# Patient Record
Sex: MALE | Race: WHITE
[De-identification: names, ages, dates, MRNs, and addresses within clinical notes are randomized per-mention and may not be internally consistent; named-entity substitution may affect disease eponyms.]

---

## 2021-10-15 ENCOUNTER — HOSPITAL ENCOUNTER (OUTPATIENT)
Dept: HOSPITAL 47 - ORWHC2ENDO | Age: 61
Discharge: HOME | End: 2021-10-15
Attending: INTERNAL MEDICINE
Payer: COMMERCIAL

## 2021-10-15 VITALS
RESPIRATION RATE: 16 BRPM | TEMPERATURE: 97.3 F | HEART RATE: 54 BPM | DIASTOLIC BLOOD PRESSURE: 89 MMHG | SYSTOLIC BLOOD PRESSURE: 149 MMHG

## 2021-10-15 VITALS — BODY MASS INDEX: 33 KG/M2

## 2021-10-15 DIAGNOSIS — G47.33: ICD-10-CM

## 2021-10-15 DIAGNOSIS — E11.9: ICD-10-CM

## 2021-10-15 DIAGNOSIS — D12.8: ICD-10-CM

## 2021-10-15 DIAGNOSIS — Z12.11: Primary | ICD-10-CM

## 2021-10-15 DIAGNOSIS — E66.9: ICD-10-CM

## 2021-10-15 LAB — GLUCOSE BLD-MCNC: 164 MG/DL (ref 75–99)

## 2021-10-15 PROCEDURE — 45334 SIGMOIDOSCOPY FOR BLEEDING: CPT

## 2021-10-15 PROCEDURE — 45338 SIGMOIDOSCOPY W/TUMR REMOVE: CPT

## 2021-10-15 PROCEDURE — 45331 SIGMOIDOSCOPY AND BIOPSY: CPT

## 2021-10-15 PROCEDURE — 88305 TISSUE EXAM BY PATHOLOGIST: CPT

## 2021-10-15 RX ADMIN — POTASSIUM CHLORIDE SCH MLS: 14.9 INJECTION, SOLUTION INTRAVENOUS at 06:50

## 2021-10-15 NOTE — P.PCN
Date of Procedure: 10/15/21


Procedure(s) Performed: 


BRIEF HISTORY: Patient is a 60-year-old pleasant white male scheduled for an 

elective colonoscopy as a part of follow-up of large rectal polyp noted on a 

colonoscopy a month ago.  Biopsies revealed adenoma.


 


PROCEDURE PERFORMED: Sigmoidoscopy with snare polypectomy and argon plasma 

coagulation





PREOPERATIVE DIAGNOSIS: Follow-up large rectal polyps noted on recent. 





IV sedation per Anesthesia. 





PROCEDURE: After informed consent was obtained, the patient, was brought into 

the endoscopy unit. IV sedation was administered by Anesthesia under continuous 

monitoring.  Digital rectal examination was normal. Initially the Olympus CF-160

flexible video colonoscope was then inserted in the rectum, gradually advanced 

into the sigmoid colon.  Prep was fair.  Mucosa of the sigmoid colon appeared 

normal.  In the mid rectum there was a 1.5 cm sessile flat polyp that was 

partially removed by snare polypectomy followed by hot biopsy and argon plasma 

coagulation.  In the distal rectum, just proximal to the dentate line there were

2 polyps adjacent to each other.  They measured 5 mm and 1 cm in size both of 

which were removed by snare polypectomy and complete polypectomy accomplished.. 

Retroflexion was performed in the rectum and no lesions were seen. The patient 

tolerated the procedure well. 





IMPRESSION: 


1.5 cmi flat polyp in the mid rectum status post piecemeal snare polypectomy 

followed by hot biopsy and argon plasma coagulation 


1 cm residual distal rectal polyp just proximal to the dentate line status post 

snare polypectomy


5 mm distal rectal polyp status post polypectomy 





RECOMMENDATIONS:  Findings of this examination were discussed with the patientas

well as his family.  He was advised to follow with the biopsy results.  Reason 

the biopsy results will plan a repeat flexible sigmoidoscope in 6 months].

## 2022-04-29 ENCOUNTER — HOSPITAL ENCOUNTER (OUTPATIENT)
Dept: HOSPITAL 47 - ORWHC2ENDO | Age: 62
Discharge: HOME | End: 2022-04-29
Attending: INTERNAL MEDICINE
Payer: COMMERCIAL

## 2022-04-29 VITALS — RESPIRATION RATE: 16 BRPM

## 2022-04-29 VITALS — TEMPERATURE: 97.7 F

## 2022-04-29 VITALS — SYSTOLIC BLOOD PRESSURE: 108 MMHG | HEART RATE: 68 BPM | DIASTOLIC BLOOD PRESSURE: 44 MMHG

## 2022-04-29 VITALS — BODY MASS INDEX: 30.9 KG/M2

## 2022-04-29 DIAGNOSIS — E11.9: ICD-10-CM

## 2022-04-29 DIAGNOSIS — G47.33: ICD-10-CM

## 2022-04-29 DIAGNOSIS — Z98.890: ICD-10-CM

## 2022-04-29 DIAGNOSIS — Z79.899: ICD-10-CM

## 2022-04-29 DIAGNOSIS — Z86.010: ICD-10-CM

## 2022-04-29 DIAGNOSIS — E78.5: ICD-10-CM

## 2022-04-29 DIAGNOSIS — Z79.84: ICD-10-CM

## 2022-04-29 DIAGNOSIS — I10: ICD-10-CM

## 2022-04-29 DIAGNOSIS — Z12.11: Primary | ICD-10-CM

## 2022-04-29 DIAGNOSIS — Z90.89: ICD-10-CM

## 2022-04-29 DIAGNOSIS — D12.8: ICD-10-CM

## 2022-04-29 LAB — GLUCOSE BLD-MCNC: 101 MG/DL (ref 75–99)

## 2022-04-29 PROCEDURE — 88305 TISSUE EXAM BY PATHOLOGIST: CPT

## 2022-04-29 PROCEDURE — 45338 SIGMOIDOSCOPY W/TUMR REMOVE: CPT

## 2022-04-29 NOTE — P.PCN
Date of Procedure: 04/29/22


Procedure(s) Performed: 


BRIEF HISTORY: Patient is a 61-year-old pleasant white male scheduled for an 

elective colonoscopy as a part of follow-up of large rectal polyps with high-

grade dysplasia noted on a colonoscopy in October 2021.





PROCEDURE PERFORMED: Flexible sigmoidoscopy with snare polypectomy. 





PREOPERATIVE DIAGNOSIS: Follow-up rectal polyps with high-grade dysplasia. 





IV sedation per Anesthesia. 





PROCEDURE: After informed consent was obtained, the patient, was brought into 

the endoscopy unit. IV sedation was administered by Anesthesia under continuous 

monitoring.  Digital rectal examination was normal. Initially the Olympus CF-160

flexible video colonoscope was then inserted in the rectum, gradually advanced 

into the splenic flexure.  Careful examination was performed.  Prep was 

excellent.  Mucosa of the colon descending colon and sigmoid; appeared normal.  

In the distal rectum just proximal to the dentate line there was a 1 cm and 5 mm

sessile polyps removed by snare polypectomy.  There was no residual polyp noted 

in the mid rectum.  Retroflexed was performed in the rectum and no lesions were 

noted.  Patient tolerated the procedure well.





Impression: 


1 cm flat and 5 mm sessile descending colon and sigmoid colon appeared normal. 


No residual polyp noted in the mid rectal polyp





Recommendations:


 Findings of this examination were discussed with the patient .as well as his 

family.  He was advised to follow with the biopsy results.  He was advised to 

have a repeat colonoscopy in 3 years.

## 2023-03-20 ENCOUNTER — HOSPITAL ENCOUNTER (EMERGENCY)
Dept: HOSPITAL 47 - EC | Age: 63
Discharge: HOME | End: 2023-03-20
Payer: COMMERCIAL

## 2023-03-20 VITALS — TEMPERATURE: 98.2 F

## 2023-03-20 VITALS — DIASTOLIC BLOOD PRESSURE: 99 MMHG | SYSTOLIC BLOOD PRESSURE: 119 MMHG

## 2023-03-20 VITALS — HEART RATE: 83 BPM | RESPIRATION RATE: 17 BRPM

## 2023-03-20 DIAGNOSIS — Z79.84: ICD-10-CM

## 2023-03-20 DIAGNOSIS — Z79.899: ICD-10-CM

## 2023-03-20 DIAGNOSIS — E11.9: ICD-10-CM

## 2023-03-20 DIAGNOSIS — G47.30: ICD-10-CM

## 2023-03-20 DIAGNOSIS — I10: ICD-10-CM

## 2023-03-20 DIAGNOSIS — I48.91: Primary | ICD-10-CM

## 2023-03-20 LAB
ALBUMIN SERPL-MCNC: 4 G/DL (ref 3.5–5)
ALP SERPL-CCNC: 35 U/L (ref 38–126)
ALT SERPL-CCNC: 21 U/L (ref 4–49)
ANION GAP SERPL CALC-SCNC: 7 MMOL/L
APTT BLD: 22.3 SEC (ref 22–30)
AST SERPL-CCNC: 21 U/L (ref 17–59)
BASOPHILS # BLD AUTO: 0.1 K/UL (ref 0–0.2)
BASOPHILS NFR BLD AUTO: 1 %
BUN SERPL-SCNC: 19 MG/DL (ref 9–20)
CALCIUM SPEC-MCNC: 9.1 MG/DL (ref 8.4–10.2)
CHLORIDE SERPL-SCNC: 108 MMOL/L (ref 98–107)
CO2 SERPL-SCNC: 23 MMOL/L (ref 22–30)
EOSINOPHIL # BLD AUTO: 0.2 K/UL (ref 0–0.7)
EOSINOPHIL NFR BLD AUTO: 2 %
ERYTHROCYTE [DISTWIDTH] IN BLOOD BY AUTOMATED COUNT: 5.16 M/UL (ref 4.3–5.9)
ERYTHROCYTE [DISTWIDTH] IN BLOOD: 12.2 % (ref 11.5–15.5)
GLUCOSE SERPL-MCNC: 127 MG/DL (ref 74–99)
HCT VFR BLD AUTO: 43.5 % (ref 39–53)
HGB BLD-MCNC: 15.1 GM/DL (ref 13–17.5)
INR PPP: 1 (ref ?–1.2)
LYMPHOCYTES # SPEC AUTO: 3.2 K/UL (ref 1–4.8)
LYMPHOCYTES NFR SPEC AUTO: 35 %
MAGNESIUM SPEC-SCNC: 1.4 MG/DL (ref 1.6–2.3)
MCH RBC QN AUTO: 29.2 PG (ref 25–35)
MCHC RBC AUTO-ENTMCNC: 34.6 G/DL (ref 31–37)
MCV RBC AUTO: 84.3 FL (ref 80–100)
MONOCYTES # BLD AUTO: 0.4 K/UL (ref 0–1)
MONOCYTES NFR BLD AUTO: 4 %
NEUTROPHILS # BLD AUTO: 5.3 K/UL (ref 1.3–7.7)
NEUTROPHILS NFR BLD AUTO: 57 %
PLATELET # BLD AUTO: 371 K/UL (ref 150–450)
POTASSIUM SERPL-SCNC: 3.8 MMOL/L (ref 3.5–5.1)
PROT SERPL-MCNC: 6.1 G/DL (ref 6.3–8.2)
PT BLD: 10.7 SEC (ref 9–12)
SODIUM SERPL-SCNC: 138 MMOL/L (ref 137–145)
T4 FREE SERPL-MCNC: 1.04 NG/DL (ref 0.78–2.19)
WBC # BLD AUTO: 9.2 K/UL (ref 3.8–10.6)

## 2023-03-20 PROCEDURE — 93005 ELECTROCARDIOGRAM TRACING: CPT

## 2023-03-20 PROCEDURE — 80053 COMPREHEN METABOLIC PANEL: CPT

## 2023-03-20 PROCEDURE — 85730 THROMBOPLASTIN TIME PARTIAL: CPT

## 2023-03-20 PROCEDURE — 84443 ASSAY THYROID STIM HORMONE: CPT

## 2023-03-20 PROCEDURE — 99285 EMERGENCY DEPT VISIT HI MDM: CPT

## 2023-03-20 PROCEDURE — 84439 ASSAY OF FREE THYROXINE: CPT

## 2023-03-20 PROCEDURE — 36415 COLL VENOUS BLD VENIPUNCTURE: CPT

## 2023-03-20 PROCEDURE — 84481 FREE ASSAY (FT-3): CPT

## 2023-03-20 PROCEDURE — 85610 PROTHROMBIN TIME: CPT

## 2023-03-20 PROCEDURE — 85025 COMPLETE CBC W/AUTO DIFF WBC: CPT

## 2023-03-20 PROCEDURE — 83735 ASSAY OF MAGNESIUM: CPT

## 2023-03-20 PROCEDURE — 84484 ASSAY OF TROPONIN QUANT: CPT

## 2023-03-20 PROCEDURE — 71046 X-RAY EXAM CHEST 2 VIEWS: CPT

## 2023-03-20 NOTE — ED
General Adult HPI





- General


Chief complaint: Arrhythmia/Palpitations


Stated complaint: AFIB


Time Seen by Provider: 03/20/23 09:50


Source: patient, RN notes reviewed


Mode of arrival: ambulatory


Limitations: no limitations





- History of Present Illness


Initial comments: 





Patient is a pleasant 62-year-old male presenting to the emergency department 

with concern for new onset A. fib.  Patient was at orthopedics preparing for 

shoulder surgery.  Patient was placed on a monitor and found to be irregular.  

Patient was warned he could have atrial fibrillation and advised come to the 

emergency department.  Patient has symptom-free and has no complaints.  No chest

pain.  No palpitations.  No dyspnea.  No history of similar problems previously.





- Related Data


                                Home Medications











 Medication  Instructions  Recorded  Confirmed


 


Fenofibrate,Micronized 134 mg PO HS 10/14/21 03/20/23





[Fenofibrate]   


 


lisinopriL [Prinivil] 10 mg PO DAILY 10/14/21 03/20/23


 


metFORMIN HCL [Glucophage] 1,000 mg PO BID 10/14/21 03/20/23


 


Semaglutide [Ozempic] 1 mg SQ VU 04/27/22 03/20/23








                                  Previous Rx's











 Medication  Instructions  Recorded


 


Apixaban [Eliquis] 5 mg PO BID #60 tab 03/20/23











                                    Allergies











Allergy/AdvReac Type Severity Reaction Status Date / Time


 


No Known Allergies Allergy   Verified 03/20/23 10:58














Review of Systems


ROS Statement: 


Those systems with pertinent positive or pertinent negative responses have been 

documented in the HPI.





ROS Other: All systems not noted in ROS Statement are negative.


Constitutional: Denies: fever


Eyes: Denies: eye pain


ENT: Denies: ear pain


Respiratory: Denies: cough, dyspnea


Cardiovascular: Denies: chest pain, palpitations


Endocrine: Denies: fatigue


Gastrointestinal: Denies: abdominal pain


Musculoskeletal: Denies: back pain





Past Medical History


Past Medical History: Cancer, Diabetes Mellitus, Hyperlipidemia, Hypertension, 

Sleep Apnea/CPAP/BIPAP


Additional Past Medical History / Comment(s): HX BASAL CELL SKIN CANCER, uses 

CPAP, colon polyps, hx. flat colon polyp


History of Any Multi-Drug Resistant Organisms: None Reported


Past Surgical History: Adenoidectomy, Hernia Repair, Tonsillectomy


Additional Past Surgical History / Comment(s): LT SHOULDER BICEP TENDON REPAIR. 

 COLONOSCOPY, sigmoidoscopy in Oct.,.  LIPOMAS REMOVED FROM NECK AND TORSO


Past Anesthesia/Blood Transfusion Reactions: No Reported Reaction


Past Psychological History: No Psychological Hx Reported


Smoking Status: Never smoker


Past Alcohol Use History: Occasional


Past Drug Use History: None Reported





- Past Family History


  ** Mother


Family Medical History: No Reported History





General Exam


Limitations: no limitations


General appearance: alert, in no apparent distress


Head exam: Present: normocephalic


Eye exam: Present: normal appearance


Neck exam: Present: normal inspection


Respiratory exam: Present: normal lung sounds bilaterally


Cardiovascular Exam: Present: irregular rhythm, normal heart sounds


  ** Expanded


Peripheral pulses: 2+: Radial (R), Radial (L)


GI/Abdominal exam: Present: soft.  Absent: tenderness


Extremities exam: Present: normal inspection.  Absent: pedal edema, calf 

tenderness


Neurological exam: Present: alert


Psychiatric exam: Present: normal affect, normal mood


Skin exam: Present: normal color





Course


                                   Vital Signs











  03/20/23 03/20/23 03/20/23





  09:47 11:00 12:06


 


Temperature 98.2 F  


 


Pulse Rate 85 83 84


 


Respiratory 18 20 18





Rate   


 


Blood Pressure 130/89 119/99 119/99


 


O2 Sat by Pulse 99 97 98





Oximetry   














EKG Findings





- EKG Results:


EKG: interpreted by SHIRAD (Left axis.  Normal QRS.  No acute ST change.)


EKG shows: atrial fibrillation





Medical Decision Making





- Medical Decision Making





Was pt. sent in by a medical professional or institution (LUCY Bella, NP, urgent 

care, hospital, or nursing home...) When possible be specific


@  -Patient was sent in by [facility]


Did you speak to anyone other than the patient for history (EMS, parent, family,

 police, friend...)? What history was obtained from this source 


@  -No


Did you review nursing and triage notes (agree or disagree)?  Why? 


@  -I reviewed and agree with nursing and triage notes


Were old charts reviewed (outside hosp., previous admission, EMS record, old 

EKG, old radiological studies, urgent care reports/EKG's, nursing home records)?

 Report findings 


@  -No old charts were reviewed


Differential Diagnosis (chest pain, altered mental status, abdominal pain women,

 abdominal pain men, vaginal bleeding, weakness, fever, dyspnea, syncope, 

headache, dizziness, GI bleed, back pain, seizure, CVA, palpatations, mental 

health)? 


@  -not applicable


EKG interpreted by me (3pts min.).


@  -As above


X-rays interpreted by me (1pt min.).


@  -Chest x-ray shows no acute process


CT interpreted by me (1pt min.).


@  -None done


U/S interpreted by me (1pt. min.).


@  -None done


What testing was considered but not performed or refused? (CT, X-rays, U/S, 

labs)? Why?


@  -None


What meds were considered but not given or refused? Why?


@  -None


Did you discuss the management of the patient with other professionals (racheal barrett i.e. , PA, NP, lab, RT, psych nurse, , , teacher, 

, )? Give summary


@  -Case was discussed with Dr. bailey who feels patient can be safely 

discharged with anticoagulant, liquids 5 mg twice a day.


Was smoking cessation discussed for >3mins.?


@  -No


Was critical care preformed (if so, how long)?


@  -No


Were there social determinants of health that impacted care today? How? 

(Homelessness, low income, unemployed, alcoholism, drug addiction, 

transportation, low edu. Level, literacy, decrease access to med. care, correction, 

rehab)?


@  -No


Was there de-escalation of care discussed even if they declined (Discuss DNR or 

withdrawal of care, Hospice)? DNR status


@  -No


What co-morbidities impacted this encounter? (DM, HTN, Smoking, COPD, CAD, 

Cancer, CVA, ARF, Chemo, Hep., AIDS, mental health diagnosis, sleep apnea, 

morbid obesity)?


@  -None


Was patient admitted / discharged? Hospital course, mention meds given and 

route, prescriptions, significant lab abnormalities, going to OR and other 

pertinent info.


@  -Patient reevaluated and updated.  Heart rate remains stable.  Patient will 

be discharged with anticoagulant with cardiology and primary care follow-up.  

Patient is made aware he will need echo. 


Undiagnosed new problem with uncertain prognosis?


@  -No


Drug Therapy requiring intensive monitoring for toxicity (Heparin, Nitro, 

Insulin, Cardizem)?


@  -No


Were any procedures done?


@  -No


Diagnosis/symptom?


@  -New-onset A. fib


Acute, or Chronic, or Acute on Chronic?


@  -Acute


Uncomplicated (without systemic symptoms) or Complicated (systemic symptoms)?


@  -default


Side effects of treatment?


@  -No


Exacerbation, Progression, or Severe Exacerbation?


@  -No


Poses a threat to life or bodily function? How? (Chest pain, USA, MI, pneumonia,

 PE, COPD, DKA, ARF, appy, cholecystitis, CVA, Diverticulitis, Homicidal, 

Suicidal, threat to staff... and all critical care pts)


@  -No





- Lab Data


Result diagrams: 


                                 03/20/23 10:05





                                 03/20/23 10:05


                                   Lab Results











  03/20/23 03/20/23 03/20/23 Range/Units





  10:05 10:05 10:05 


 


WBC  9.2    (3.8-10.6)  k/uL


 


RBC  5.16    (4.30-5.90)  m/uL


 


Hgb  15.1    (13.0-17.5)  gm/dL


 


Hct  43.5    (39.0-53.0)  %


 


MCV  84.3    (80.0-100.0)  fL


 


MCH  29.2    (25.0-35.0)  pg


 


MCHC  34.6    (31.0-37.0)  g/dL


 


RDW  12.2    (11.5-15.5)  %


 


Plt Count  371    (150-450)  k/uL


 


MPV  6.9    


 


Neutrophils %  57    %


 


Lymphocytes %  35    %


 


Monocytes %  4    %


 


Eosinophils %  2    %


 


Basophils %  1    %


 


Neutrophils #  5.3    (1.3-7.7)  k/uL


 


Lymphocytes #  3.2    (1.0-4.8)  k/uL


 


Monocytes #  0.4    (0-1.0)  k/uL


 


Eosinophils #  0.2    (0-0.7)  k/uL


 


Basophils #  0.1    (0-0.2)  k/uL


 


PT   10.7   (9.0-12.0)  sec


 


INR   1.0   (<1.2)  


 


APTT   22.3   (22.0-30.0)  sec


 


Sodium    138  (137-145)  mmol/L


 


Potassium    3.8  (3.5-5.1)  mmol/L


 


Chloride    108 H  ()  mmol/L


 


Carbon Dioxide    23  (22-30)  mmol/L


 


Anion Gap    7  mmol/L


 


BUN    19  (9-20)  mg/dL


 


Creatinine    0.86  (0.66-1.25)  mg/dL


 


Est GFR (CKD-EPI)AfAm    >90  (>60 ml/min/1.73 sqM)  


 


Est GFR (CKD-EPI)NonAf    >90  (>60 ml/min/1.73 sqM)  


 


Glucose    127 H  (74-99)  mg/dL


 


Calcium    9.1  (8.4-10.2)  mg/dL


 


Magnesium    1.4 L  (1.6-2.3)  mg/dL


 


Total Bilirubin    0.7  (0.2-1.3)  mg/dL


 


AST    21  (17-59)  U/L


 


ALT    21  (4-49)  U/L


 


Alkaline Phosphatase    35 L  ()  U/L


 


Troponin I     (0.000-0.034)  ng/mL


 


Total Protein    6.1 L  (6.3-8.2)  g/dL


 


Albumin    4.0  (3.5-5.0)  g/dL


 


TSH    1.340  (0.465-4.680)  mIU/L


 


Free T4    1.04  (0.78-2.19)  ng/dL


 


Free T3 pg/mL    3.7  (2.8-5.3)  pg/ml














  03/20/23 Range/Units





  10:05 


 


WBC   (3.8-10.6)  k/uL


 


RBC   (4.30-5.90)  m/uL


 


Hgb   (13.0-17.5)  gm/dL


 


Hct   (39.0-53.0)  %


 


MCV   (80.0-100.0)  fL


 


MCH   (25.0-35.0)  pg


 


MCHC   (31.0-37.0)  g/dL


 


RDW   (11.5-15.5)  %


 


Plt Count   (150-450)  k/uL


 


MPV   


 


Neutrophils %   %


 


Lymphocytes %   %


 


Monocytes %   %


 


Eosinophils %   %


 


Basophils %   %


 


Neutrophils #   (1.3-7.7)  k/uL


 


Lymphocytes #   (1.0-4.8)  k/uL


 


Monocytes #   (0-1.0)  k/uL


 


Eosinophils #   (0-0.7)  k/uL


 


Basophils #   (0-0.2)  k/uL


 


PT   (9.0-12.0)  sec


 


INR   (<1.2)  


 


APTT   (22.0-30.0)  sec


 


Sodium   (137-145)  mmol/L


 


Potassium   (3.5-5.1)  mmol/L


 


Chloride   ()  mmol/L


 


Carbon Dioxide   (22-30)  mmol/L


 


Anion Gap   mmol/L


 


BUN   (9-20)  mg/dL


 


Creatinine   (0.66-1.25)  mg/dL


 


Est GFR (CKD-EPI)AfAm   (>60 ml/min/1.73 sqM)  


 


Est GFR (CKD-EPI)NonAf   (>60 ml/min/1.73 sqM)  


 


Glucose   (74-99)  mg/dL


 


Calcium   (8.4-10.2)  mg/dL


 


Magnesium   (1.6-2.3)  mg/dL


 


Total Bilirubin   (0.2-1.3)  mg/dL


 


AST   (17-59)  U/L


 


ALT   (4-49)  U/L


 


Alkaline Phosphatase   ()  U/L


 


Troponin I  <0.012  (0.000-0.034)  ng/mL


 


Total Protein   (6.3-8.2)  g/dL


 


Albumin   (3.5-5.0)  g/dL


 


TSH   (0.465-4.680)  mIU/L


 


Free T4   (0.78-2.19)  ng/dL


 


Free T3 pg/mL   (2.8-5.3)  pg/ml














Disposition


Clinical Impression: 


 New onset atrial fibrillation





Disposition: HOME SELF-CARE


Condition: Stable


Instructions (If sedation given, give patient instructions):  A-fib (Atrial 

Fibrillation) (ED)


Additional Instructions: 


Please do follow-up with your primary care physician in the next couple days for

 recheck.  You will need echo done.  Please also follow-up with cardiology, 

number provided.  Prescription has been sent to pharmacy.


Prescriptions: 


Apixaban [Eliquis] 5 mg PO BID #60 tab


Is patient prescribed a controlled substance at d/c from ED?: No


Referrals: 


Lavern Fuentes NPC [REFERRING] - 1-2 days


Isaias Zavala MD [STAFF PHYSICIAN] - 1-2 days


Time of Disposition: 13:20

## 2023-03-20 NOTE — XR
EXAMINATION TYPE: XR chest 2V

 

DATE OF EXAM: 3/20/2023

 

COMPARISON: NONE

 

HISTORY: Dysrhythmia.

 

TECHNIQUE:  Frontal and lateral views of the chest are obtained.

 

FINDINGS:  There is no focal air space opacity, pleural effusion, or pneumothorax seen.  The cardiac 
silhouette size is within normal limits.   The osseous structures are intact. Overlying EKG leads are
 present.

 

IMPRESSION:  No acute cardiopulmonary process.

## 2025-06-11 ENCOUNTER — HOSPITAL ENCOUNTER (OUTPATIENT)
Dept: HOSPITAL 47 - ORWHC2ENDO | Age: 65
End: 2025-06-11
Attending: INTERNAL MEDICINE
Payer: COMMERCIAL

## 2025-06-11 VITALS — TEMPERATURE: 98 F

## 2025-06-11 VITALS — SYSTOLIC BLOOD PRESSURE: 121 MMHG | DIASTOLIC BLOOD PRESSURE: 98 MMHG | RESPIRATION RATE: 20 BRPM | HEART RATE: 70 BPM

## 2025-06-11 VITALS — BODY MASS INDEX: 30.9 KG/M2

## 2025-06-11 DIAGNOSIS — Z99.89: ICD-10-CM

## 2025-06-11 DIAGNOSIS — E11.9: ICD-10-CM

## 2025-06-11 DIAGNOSIS — I48.91: ICD-10-CM

## 2025-06-11 DIAGNOSIS — D12.0: ICD-10-CM

## 2025-06-11 DIAGNOSIS — Z79.899: ICD-10-CM

## 2025-06-11 DIAGNOSIS — G47.33: ICD-10-CM

## 2025-06-11 DIAGNOSIS — K57.30: ICD-10-CM

## 2025-06-11 DIAGNOSIS — Z79.01: ICD-10-CM

## 2025-06-11 DIAGNOSIS — Z79.84: ICD-10-CM

## 2025-06-11 DIAGNOSIS — Z86.0100: ICD-10-CM

## 2025-06-11 DIAGNOSIS — D12.8: ICD-10-CM

## 2025-06-11 DIAGNOSIS — E78.5: ICD-10-CM

## 2025-06-11 DIAGNOSIS — D12.5: ICD-10-CM

## 2025-06-11 DIAGNOSIS — Z12.11: Primary | ICD-10-CM

## 2025-06-11 DIAGNOSIS — Z88.8: ICD-10-CM

## 2025-06-11 DIAGNOSIS — I10: ICD-10-CM

## 2025-06-11 LAB — GLUCOSE BLD-MCNC: 136 MG/DL (ref 70–110)

## 2025-06-11 PROCEDURE — 45380 COLONOSCOPY AND BIOPSY: CPT

## 2025-06-11 PROCEDURE — 45385 COLONOSCOPY W/LESION REMOVAL: CPT

## 2025-06-11 PROCEDURE — 88305 TISSUE EXAM BY PATHOLOGIST: CPT

## 2025-06-11 RX ADMIN — POTASSIUM CHLORIDE SCH MLS/HR: 14.9 INJECTION, SOLUTION INTRAVENOUS at 07:00

## 2025-06-11 RX ADMIN — POTASSIUM CHLORIDE ONE MLS: 14.9 INJECTION, SOLUTION INTRAVENOUS at 06:44
